# Patient Record
Sex: FEMALE | Race: WHITE | Employment: OTHER
[De-identification: names, ages, dates, MRNs, and addresses within clinical notes are randomized per-mention and may not be internally consistent; named-entity substitution may affect disease eponyms.]

---

## 2021-11-11 ENCOUNTER — TRANSCRIBE ORDERS (OUTPATIENT)
Dept: OTHER | Age: 71
End: 2021-11-11

## 2021-11-11 ENCOUNTER — PATIENT OUTREACH (OUTPATIENT)
Dept: ONCOLOGY | Facility: CLINIC | Age: 71
End: 2021-11-11

## 2021-11-11 DIAGNOSIS — Z15.89: Primary | ICD-10-CM

## 2021-11-12 NOTE — PROGRESS NOTES
RECORDS STATUS - ALL OTHER DIAGNOSIS      RECORDS RECEIVED FROM: [facility] ([facility])    DATE RECEIVED: 1/26/2022   NOTES STATUS DETAILS   OFFICE NOTE from referring provider N/A self referred   OFFICE NOTE from medical oncologist     DISCHARGE SUMMARY from hospital     DISCHARGE REPORT from the ER     OPERATIVE REPORT     MEDICATION LIST Complete CE   CLINICAL TRIAL TREATMENTS TO DATE     LABS     PATHOLOGY REPORTS     ANYTHING RELATED TO DIAGNOSIS Complete Labs last updated on 8/23/2021   GENONOMIC TESTING     TYPE:     IMAGING (NEED IMAGES & REPORT)     CT SCANS Requested-[facility] CT Head Brain 6/16/2021   MRI Requested- [facility] Brain    MAMMO     ULTRASOUND     PET       Action    Action Taken 11/12/2021 10:40AM KEISRAEL     I called [facility] to have IMG pushed to [id number]  [phone] -they couldn't locate her in their system    I called pt Ginny - her phone went to .

## 2021-12-06 ENCOUNTER — TRANSCRIBE ORDERS (OUTPATIENT)
Dept: OTHER | Age: 71
End: 2021-12-06
Payer: MEDICARE

## 2021-12-06 DIAGNOSIS — D48.19 HEMANGIOBLASTOMA: Primary | ICD-10-CM

## 2021-12-07 ENCOUNTER — PATIENT OUTREACH (OUTPATIENT)
Dept: ONCOLOGY | Facility: CLINIC | Age: 71
End: 2021-12-07
Payer: MEDICARE

## 2021-12-07 NOTE — PROGRESS NOTES
I rec'd a new referral in my workque for a medical oncology consult.  I called Elise and the referral is actually for her genetic counseling referral she already has scheduled for 1/26/2022 with Linda Louis.  I have attached the referral to her appointment.

## 2022-01-26 ENCOUNTER — PRE VISIT (OUTPATIENT)
Dept: ONCOLOGY | Facility: CLINIC | Age: 72
End: 2022-01-26